# Patient Record
Sex: FEMALE | Race: WHITE | Employment: FULL TIME | URBAN - METROPOLITAN AREA
[De-identification: names, ages, dates, MRNs, and addresses within clinical notes are randomized per-mention and may not be internally consistent; named-entity substitution may affect disease eponyms.]

---

## 2019-05-07 ENCOUNTER — HOSPITAL ENCOUNTER (OUTPATIENT)
Dept: GENERAL RADIOLOGY | Age: 64
Discharge: HOME | End: 2019-05-07
Payer: COMMERCIAL

## 2019-05-07 ENCOUNTER — HOSPITAL ENCOUNTER (OUTPATIENT)
Age: 64
Discharge: HOME | End: 2019-05-07
Payer: COMMERCIAL

## 2019-05-07 DIAGNOSIS — Z01.818 PREOP TESTING: ICD-10-CM

## 2019-05-07 LAB
A/G RATIO: 1.4 (ref 1.1–2.2)
ABO/RH: NORMAL
ALBUMIN SERPL-MCNC: 4.4 G/DL (ref 3.4–5)
ALP BLD-CCNC: 86 U/L (ref 40–129)
ALT SERPL-CCNC: 23 U/L (ref 10–40)
ANION GAP SERPL CALCULATED.3IONS-SCNC: 13 MMOL/L (ref 3–16)
ANTIBODY SCREEN: NORMAL
AST SERPL-CCNC: 16 U/L (ref 15–37)
BACTERIA: ABNORMAL /HPF
BASOPHILS ABSOLUTE: 0.1 K/UL (ref 0–0.2)
BASOPHILS RELATIVE PERCENT: 1.1 %
BILIRUB SERPL-MCNC: 0.6 MG/DL (ref 0–1)
BILIRUBIN URINE: NEGATIVE
BLOOD, URINE: ABNORMAL
BUN BLDV-MCNC: 18 MG/DL (ref 7–20)
CALCIUM SERPL-MCNC: 10.5 MG/DL (ref 8.3–10.6)
CHLORIDE BLD-SCNC: 106 MMOL/L (ref 99–110)
CLARITY: ABNORMAL
CO2: 22 MMOL/L (ref 21–32)
COLOR: YELLOW
CREAT SERPL-MCNC: 0.9 MG/DL (ref 0.6–1.2)
EOSINOPHILS ABSOLUTE: 0.2 K/UL (ref 0–0.6)
EOSINOPHILS RELATIVE PERCENT: 2.1 %
EPITHELIAL CELLS, UA: 5 /HPF (ref 0–5)
GFR AFRICAN AMERICAN: >60
GFR NON-AFRICAN AMERICAN: >60
GLOBULIN: 3.1 G/DL
GLUCOSE BLD-MCNC: 99 MG/DL (ref 70–99)
GLUCOSE URINE: NEGATIVE MG/DL
HCT VFR BLD CALC: 44.9 % (ref 36–48)
HEMOGLOBIN: 15 G/DL (ref 12–16)
HYALINE CASTS: 3 /LPF (ref 0–8)
KETONES, URINE: NEGATIVE MG/DL
LEUKOCYTE ESTERASE, URINE: ABNORMAL
LYMPHOCYTES ABSOLUTE: 2.5 K/UL (ref 1–5.1)
LYMPHOCYTES RELATIVE PERCENT: 27.5 %
MCH RBC QN AUTO: 29.6 PG (ref 26–34)
MCHC RBC AUTO-ENTMCNC: 33.3 G/DL (ref 31–36)
MCV RBC AUTO: 88.9 FL (ref 80–100)
MICROSCOPIC EXAMINATION: YES
MONOCYTES ABSOLUTE: 0.8 K/UL (ref 0–1.3)
MONOCYTES RELATIVE PERCENT: 8.9 %
NEUTROPHILS ABSOLUTE: 5.6 K/UL (ref 1.7–7.7)
NEUTROPHILS RELATIVE PERCENT: 60.4 %
NITRITE, URINE: NEGATIVE
PDW BLD-RTO: 13.5 % (ref 12.4–15.4)
PH UA: 6 (ref 5–8)
PLATELET # BLD: 439 K/UL (ref 135–450)
PMV BLD AUTO: 8.6 FL (ref 5–10.5)
POTASSIUM SERPL-SCNC: 4 MMOL/L (ref 3.5–5.1)
PROTEIN UA: NEGATIVE MG/DL
RBC # BLD: 5.05 M/UL (ref 4–5.2)
RBC UA: 3 /HPF (ref 0–4)
SODIUM BLD-SCNC: 141 MMOL/L (ref 136–145)
SPECIFIC GRAVITY UA: 1.02 (ref 1–1.03)
TOTAL PROTEIN: 7.5 G/DL (ref 6.4–8.2)
URINE TYPE: ABNORMAL
UROBILINOGEN, URINE: 0.2 E.U./DL
WBC # BLD: 9.2 K/UL (ref 4–11)
WBC UA: 51 /HPF (ref 0–5)

## 2019-05-07 PROCEDURE — 86900 BLOOD TYPING SEROLOGIC ABO: CPT

## 2019-05-07 PROCEDURE — 71046 X-RAY EXAM CHEST 2 VIEWS: CPT

## 2019-05-07 PROCEDURE — 85025 COMPLETE CBC W/AUTO DIFF WBC: CPT

## 2019-05-07 PROCEDURE — 81001 URINALYSIS AUTO W/SCOPE: CPT

## 2019-05-07 PROCEDURE — 80053 COMPREHEN METABOLIC PANEL: CPT

## 2019-05-07 PROCEDURE — 86850 RBC ANTIBODY SCREEN: CPT

## 2019-05-07 PROCEDURE — 87086 URINE CULTURE/COLONY COUNT: CPT

## 2019-05-07 PROCEDURE — 86901 BLOOD TYPING SEROLOGIC RH(D): CPT

## 2019-05-07 PROCEDURE — 93005 ELECTROCARDIOGRAM TRACING: CPT | Performed by: OBSTETRICS & GYNECOLOGY

## 2019-05-07 PROCEDURE — 36415 COLL VENOUS BLD VENIPUNCTURE: CPT

## 2019-05-07 RX ORDER — LATANOPROST 50 UG/ML
1 SOLUTION/ DROPS OPHTHALMIC NIGHTLY
COMMUNITY

## 2019-05-07 NOTE — PROGRESS NOTES
Name_______________________________________Printed:____________________  Date and time of surgery___5/13/19 @ 1145_____________________Arrival Time:__1015 main hosp______________   1. Do not eat or drink anything after 12 midnight (or____hours) prior to surgery. This includes no water, chewing gum or mints. Endoscopy patients follow your doctors bowel prep instructions,which may include taking part of prep after midnight. 2. Take the following pills with a small sip of water on the morning of surgery___none_______________________________________________                  Do not take blood pressure medications ending in pril or sartan the marcel prior to surgery or the morning of surgery_   3. Aspirin, Ibuprofen, Advil, Naproxen, Vitamin E and other Anti-inflammatory products should be stopped for 5 days before surgery or as directed by your physician. 4. Check with your Doctor regarding stopping Plavix, Coumadin,Eliquis, Lovenox,Effient,Pradaxa,Xarelto, Fragmin or other blood thinners and follow their instructions. 5. Do not smoke, and do not drink any alcoholic beverages 24 hours prior to surgery. This includes NA Beer. Refrain from the usage of any recreational drugs. 6. You may brush your teeth and gargle the morning of surgery. DO NOT SWALLOW WATER   7. You MUST make arrangements for a responsible adult to stay on site while you are here and take you home after your surgery. You will not be allowed to leave alone or drive yourself home. It is strongly suggested someone stay with you the first 24 hrs. Your surgery will be cancelled if you do not have a ride home. 8. A parent/legal guardian must accompany a child scheduled for surgery and plan to stay at the hospital until the child is discharged. Please do not bring other children with you.    9. Please wear simple, loose fitting clothing to the hospital.  Do not bring valuables (money, credit cards, checkbooks, etc.) Do not wear any makeup (including no eye makeup) or nail polish on your fingers or toes. 10. DO NOT wear any jewelry or piercings on day of surgery. All body piercing jewelry must be removed. 11. If you have ___dentures, they will be removed before going to the OR; we will provide you a container. If you wear ___contact lenses or __x_glasses, they will be removed; please bring a case for them. 12. Please see your family doctor/pediatrician for a history & physical and/or concerning medications. Bring any test results/reports from your physician's office. PCP_____wrenn_____________Phone___________H&P Appt. Date________             13 If you  have a Living Will and Durable Power of  for Healthcare, please bring in a copy. 15. Notify your Surgeon if you develop any illness between now and surgery  time, cough, cold, fever, sore throat, nausea, vomiting, etc.  Please notify your surgeon if you experience dizziness, shortness of breath or blurred vision between now & the time of your surgery             15. DO NOT shave your operative site 96 hours prior to surgery. For face & neck surgery, men may use an electric razor 48 hours prior to surgery. 16. Shower the night before surgery with _x__Antibacterial soap ___Hibiclens             17. To provide excellent care visitors will be limited to one in the room at any given time. 18.  Please bring picture ID and insurance card. 19.  Visit our web site for additional information:  [email or web address]              20.During flu season no children under the age of 15 are permitted in the hospital for the safety of all patients.                               21. If you take a long acting insulin in the evening only  take half of your usual  dose the night  before your procedure              22. If you use a c-pap please bring DOS if staying overnight,             23.For your convenience [facility] has a pharmacy on site to fill your prescriptions. 24. If you use oxygen and have a portable tank please bring it  with you the DOS             25. Bring a complete list of all your medications with name and dose include any supplements. 26. Other__________________________________________   *Please call pre admission testing if you any further questions   Moraima Mendez     [id number] [facility]  [phone]   [address] [place] [address]       All above information reviewed with patient in person or by phone. Patient verbalizes understanding. All questions and concerns addressed.                                                                                                  Patient/Rep_______pt_____________                                                                                                                                    PRE OP INSTRUCTIONS

## 2019-05-09 LAB
EKG ATRIAL RATE: 63 BPM
EKG DIAGNOSIS: NORMAL
EKG P AXIS: 23 DEGREES
EKG P-R INTERVAL: 160 MS
EKG Q-T INTERVAL: 398 MS
EKG QRS DURATION: 78 MS
EKG QTC CALCULATION (BAZETT): 407 MS
EKG R AXIS: 27 DEGREES
EKG T AXIS: 39 DEGREES
EKG VENTRICULAR RATE: 63 BPM
URINE CULTURE, ROUTINE: NORMAL

## 2019-05-09 PROCEDURE — 93010 ELECTROCARDIOGRAM REPORT: CPT | Performed by: INTERNAL MEDICINE

## 2019-05-13 ENCOUNTER — HOSPITAL ENCOUNTER (OUTPATIENT)
Age: 64
Discharge: HOME | End: 2019-05-14
Attending: OBSTETRICS & GYNECOLOGY | Admitting: OBSTETRICS & GYNECOLOGY
Payer: COMMERCIAL

## 2019-05-13 ENCOUNTER — ANESTHESIA (OUTPATIENT)
Dept: OPERATING ROOM | Age: 64
End: 2019-05-13
Payer: COMMERCIAL

## 2019-05-13 ENCOUNTER — ANESTHESIA EVENT (OUTPATIENT)
Dept: OPERATING ROOM | Age: 64
End: 2019-05-13
Payer: COMMERCIAL

## 2019-05-13 VITALS
TEMPERATURE: 97.3 F | OXYGEN SATURATION: 87 % | SYSTOLIC BLOOD PRESSURE: 127 MMHG | RESPIRATION RATE: 8 BRPM | DIASTOLIC BLOOD PRESSURE: 62 MMHG

## 2019-05-13 DIAGNOSIS — Z01.818 PREOP TESTING: Primary | ICD-10-CM

## 2019-05-13 PROBLEM — C54.1 ENDOMETRIAL CANCER (HCC): Status: ACTIVE | Noted: 2019-05-13

## 2019-05-13 LAB
ABO/RH: NORMAL
ANTIBODY SCREEN: NORMAL

## 2019-05-13 PROCEDURE — 3700000001 HC ADD 15 MINUTES (ANESTHESIA): Performed by: OBSTETRICS & GYNECOLOGY

## 2019-05-13 PROCEDURE — 2580000003 HC RX 258: Performed by: NURSE ANESTHETIST, CERTIFIED REGISTERED

## 2019-05-13 PROCEDURE — 3600000019 HC SURGERY ROBOT ADDTL 15MIN: Performed by: OBSTETRICS & GYNECOLOGY

## 2019-05-13 PROCEDURE — 2580000003 HC RX 258

## 2019-05-13 PROCEDURE — 2500000003 HC RX 250 WO HCPCS: Performed by: OBSTETRICS & GYNECOLOGY

## 2019-05-13 PROCEDURE — 86850 RBC ANTIBODY SCREEN: CPT

## 2019-05-13 PROCEDURE — 6360000002 HC RX W HCPCS: Performed by: OBSTETRICS & GYNECOLOGY

## 2019-05-13 PROCEDURE — 6370000000 HC RX 637 (ALT 250 FOR IP)

## 2019-05-13 PROCEDURE — 7100000001 HC PACU RECOVERY - ADDTL 15 MIN: Performed by: OBSTETRICS & GYNECOLOGY

## 2019-05-13 PROCEDURE — 2709999900 HC NON-CHARGEABLE SUPPLY: Performed by: OBSTETRICS & GYNECOLOGY

## 2019-05-13 PROCEDURE — 2580000003 HC RX 258: Performed by: OBSTETRICS & GYNECOLOGY

## 2019-05-13 PROCEDURE — 6370000000 HC RX 637 (ALT 250 FOR IP): Performed by: OBSTETRICS & GYNECOLOGY

## 2019-05-13 PROCEDURE — 6360000002 HC RX W HCPCS: Performed by: NURSE ANESTHETIST, CERTIFIED REGISTERED

## 2019-05-13 PROCEDURE — 6370000000 HC RX 637 (ALT 250 FOR IP): Performed by: NURSE ANESTHETIST, CERTIFIED REGISTERED

## 2019-05-13 PROCEDURE — 88331 PATH CONSLTJ SURG 1 BLK 1SPC: CPT

## 2019-05-13 PROCEDURE — 7100000000 HC PACU RECOVERY - FIRST 15 MIN: Performed by: OBSTETRICS & GYNECOLOGY

## 2019-05-13 PROCEDURE — 94150 VITAL CAPACITY TEST: CPT

## 2019-05-13 PROCEDURE — 88309 TISSUE EXAM BY PATHOLOGIST: CPT

## 2019-05-13 PROCEDURE — S2900 ROBOTIC SURGICAL SYSTEM: HCPCS | Performed by: OBSTETRICS & GYNECOLOGY

## 2019-05-13 PROCEDURE — 3700000000 HC ANESTHESIA ATTENDED CARE: Performed by: OBSTETRICS & GYNECOLOGY

## 2019-05-13 PROCEDURE — 3600000009 HC SURGERY ROBOT BASE: Performed by: OBSTETRICS & GYNECOLOGY

## 2019-05-13 PROCEDURE — 88307 TISSUE EXAM BY PATHOLOGIST: CPT

## 2019-05-13 PROCEDURE — 6360000002 HC RX W HCPCS

## 2019-05-13 PROCEDURE — 86901 BLOOD TYPING SEROLOGIC RH(D): CPT

## 2019-05-13 PROCEDURE — 2500000003 HC RX 250 WO HCPCS: Performed by: NURSE ANESTHETIST, CERTIFIED REGISTERED

## 2019-05-13 PROCEDURE — 36415 COLL VENOUS BLD VENIPUNCTURE: CPT

## 2019-05-13 PROCEDURE — 86900 BLOOD TYPING SEROLOGIC ABO: CPT

## 2019-05-13 PROCEDURE — 6360000002 HC RX W HCPCS: Performed by: ANESTHESIOLOGY

## 2019-05-13 PROCEDURE — 94760 N-INVAS EAR/PLS OXIMETRY 1: CPT

## 2019-05-13 RX ORDER — NEOSTIGMINE METHYLSULFATE 5 MG/5 ML
SYRINGE (ML) INTRAVENOUS PRN
Status: DISCONTINUED | OUTPATIENT
Start: 2019-05-13 | End: 2019-05-13 | Stop reason: SDUPTHER

## 2019-05-13 RX ORDER — LATANOPROST 50 UG/ML
1 SOLUTION/ DROPS OPHTHALMIC NIGHTLY
Status: DISCONTINUED | OUTPATIENT
Start: 2019-05-13 | End: 2019-05-14 | Stop reason: HOSPADM

## 2019-05-13 RX ORDER — KETOROLAC TROMETHAMINE 30 MG/ML
INJECTION, SOLUTION INTRAMUSCULAR; INTRAVENOUS
Status: COMPLETED
Start: 2019-05-13 | End: 2019-05-13

## 2019-05-13 RX ORDER — ACETAMINOPHEN 500 MG
500 TABLET ORAL EVERY 6 HOURS
Status: DISCONTINUED | OUTPATIENT
Start: 2019-05-13 | End: 2019-05-14 | Stop reason: HOSPADM

## 2019-05-13 RX ORDER — LIDOCAINE HYDROCHLORIDE 20 MG/ML
INJECTION, SOLUTION INFILTRATION; PERINEURAL PRN
Status: DISCONTINUED | OUTPATIENT
Start: 2019-05-13 | End: 2019-05-13 | Stop reason: SDUPTHER

## 2019-05-13 RX ORDER — HYDROMORPHONE HCL 110MG/55ML
0.25 PATIENT CONTROLLED ANALGESIA SYRINGE INTRAVENOUS EVERY 5 MIN PRN
Status: DISCONTINUED | OUTPATIENT
Start: 2019-05-13 | End: 2019-05-13 | Stop reason: HOSPADM

## 2019-05-13 RX ORDER — MAGNESIUM HYDROXIDE 1200 MG/15ML
LIQUID ORAL
Status: COMPLETED | OUTPATIENT
Start: 2019-05-13 | End: 2019-05-13

## 2019-05-13 RX ORDER — PROMETHAZINE HYDROCHLORIDE 25 MG/ML
6.25 INJECTION, SOLUTION INTRAMUSCULAR; INTRAVENOUS EVERY 30 MIN PRN
Status: DISCONTINUED | OUTPATIENT
Start: 2019-05-13 | End: 2019-05-13 | Stop reason: HOSPADM

## 2019-05-13 RX ORDER — HYDROCODONE BITARTRATE AND ACETAMINOPHEN 5; 325 MG/1; MG/1
1 TABLET ORAL PRN
Status: DISCONTINUED | OUTPATIENT
Start: 2019-05-13 | End: 2019-05-13 | Stop reason: HOSPADM

## 2019-05-13 RX ORDER — SODIUM CHLORIDE 0.9 % (FLUSH) 0.9 %
10 SYRINGE (ML) INJECTION PRN
Status: DISCONTINUED | OUTPATIENT
Start: 2019-05-13 | End: 2019-05-14 | Stop reason: HOSPADM

## 2019-05-13 RX ORDER — HYDROMORPHONE HCL 110MG/55ML
0.5 PATIENT CONTROLLED ANALGESIA SYRINGE INTRAVENOUS EVERY 5 MIN PRN
Status: DISCONTINUED | OUTPATIENT
Start: 2019-05-13 | End: 2019-05-13 | Stop reason: HOSPADM

## 2019-05-13 RX ORDER — SODIUM CHLORIDE, SODIUM LACTATE, POTASSIUM CHLORIDE, CALCIUM CHLORIDE 600; 310; 30; 20 MG/100ML; MG/100ML; MG/100ML; MG/100ML
INJECTION, SOLUTION INTRAVENOUS CONTINUOUS
Status: DISCONTINUED | OUTPATIENT
Start: 2019-05-13 | End: 2019-05-13

## 2019-05-13 RX ORDER — SODIUM CHLORIDE, SODIUM LACTATE, POTASSIUM CHLORIDE, CALCIUM CHLORIDE 600; 310; 30; 20 MG/100ML; MG/100ML; MG/100ML; MG/100ML
INJECTION, SOLUTION INTRAVENOUS CONTINUOUS PRN
Status: DISCONTINUED | OUTPATIENT
Start: 2019-05-13 | End: 2019-05-13 | Stop reason: SDUPTHER

## 2019-05-13 RX ORDER — DEXTROSE AND SODIUM CHLORIDE 5; .45 G/100ML; G/100ML
INJECTION, SOLUTION INTRAVENOUS CONTINUOUS
Status: DISCONTINUED | OUTPATIENT
Start: 2019-05-13 | End: 2019-05-14

## 2019-05-13 RX ORDER — ALBUTEROL SULFATE 90 UG/1
AEROSOL, METERED RESPIRATORY (INHALATION) PRN
Status: DISCONTINUED | OUTPATIENT
Start: 2019-05-13 | End: 2019-05-13 | Stop reason: SDUPTHER

## 2019-05-13 RX ORDER — BUPIVACAINE HYDROCHLORIDE AND EPINEPHRINE 5; 5 MG/ML; UG/ML
INJECTION, SOLUTION EPIDURAL; INTRACAUDAL; PERINEURAL
Status: COMPLETED | OUTPATIENT
Start: 2019-05-13 | End: 2019-05-13

## 2019-05-13 RX ORDER — SODIUM CHLORIDE 0.9 % (FLUSH) 0.9 %
10 SYRINGE (ML) INJECTION EVERY 12 HOURS SCHEDULED
Status: DISCONTINUED | OUTPATIENT
Start: 2019-05-13 | End: 2019-05-14 | Stop reason: HOSPADM

## 2019-05-13 RX ORDER — FENTANYL CITRATE 50 UG/ML
25 INJECTION, SOLUTION INTRAMUSCULAR; INTRAVENOUS EVERY 5 MIN PRN
Status: DISCONTINUED | OUTPATIENT
Start: 2019-05-13 | End: 2019-05-13 | Stop reason: HOSPADM

## 2019-05-13 RX ORDER — MEPERIDINE HYDROCHLORIDE 25 MG/ML
12.5 INJECTION INTRAMUSCULAR; INTRAVENOUS; SUBCUTANEOUS EVERY 5 MIN PRN
Status: DISCONTINUED | OUTPATIENT
Start: 2019-05-13 | End: 2019-05-13 | Stop reason: HOSPADM

## 2019-05-13 RX ORDER — LIDOCAINE HYDROCHLORIDE 10 MG/ML
0.5 INJECTION, SOLUTION EPIDURAL; INFILTRATION; INTRACAUDAL; PERINEURAL ONCE
Status: DISCONTINUED | OUTPATIENT
Start: 2019-05-13 | End: 2019-05-13 | Stop reason: HOSPADM

## 2019-05-13 RX ORDER — SUCCINYLCHOLINE CHLORIDE 20 MG/ML
INJECTION INTRAMUSCULAR; INTRAVENOUS PRN
Status: DISCONTINUED | OUTPATIENT
Start: 2019-05-13 | End: 2019-05-13 | Stop reason: SDUPTHER

## 2019-05-13 RX ORDER — FENTANYL CITRATE 50 UG/ML
50 INJECTION, SOLUTION INTRAMUSCULAR; INTRAVENOUS EVERY 5 MIN PRN
Status: DISCONTINUED | OUTPATIENT
Start: 2019-05-13 | End: 2019-05-13 | Stop reason: HOSPADM

## 2019-05-13 RX ORDER — DIPHENHYDRAMINE HYDROCHLORIDE 50 MG/ML
12.5 INJECTION INTRAMUSCULAR; INTRAVENOUS
Status: DISCONTINUED | OUTPATIENT
Start: 2019-05-13 | End: 2019-05-13 | Stop reason: HOSPADM

## 2019-05-13 RX ORDER — ONDANSETRON 2 MG/ML
4 INJECTION INTRAMUSCULAR; INTRAVENOUS EVERY 8 HOURS PRN
Status: DISCONTINUED | OUTPATIENT
Start: 2019-05-13 | End: 2019-05-14 | Stop reason: HOSPADM

## 2019-05-13 RX ORDER — KETOROLAC TROMETHAMINE 30 MG/ML
15 INJECTION, SOLUTION INTRAMUSCULAR; INTRAVENOUS EVERY 6 HOURS
Status: DISCONTINUED | OUTPATIENT
Start: 2019-05-13 | End: 2019-05-14 | Stop reason: HOSPADM

## 2019-05-13 RX ORDER — HYDROMORPHONE HYDROCHLORIDE 1 MG/ML
0.5 INJECTION, SOLUTION INTRAMUSCULAR; INTRAVENOUS; SUBCUTANEOUS
Status: DISCONTINUED | OUTPATIENT
Start: 2019-05-13 | End: 2019-05-14 | Stop reason: HOSPADM

## 2019-05-13 RX ORDER — HYDROMORPHONE HCL 110MG/55ML
PATIENT CONTROLLED ANALGESIA SYRINGE INTRAVENOUS
Status: COMPLETED
Start: 2019-05-13 | End: 2019-05-13

## 2019-05-13 RX ORDER — ONDANSETRON 2 MG/ML
INJECTION INTRAMUSCULAR; INTRAVENOUS PRN
Status: DISCONTINUED | OUTPATIENT
Start: 2019-05-13 | End: 2019-05-13 | Stop reason: SDUPTHER

## 2019-05-13 RX ORDER — PROPOFOL 10 MG/ML
INJECTION, EMULSION INTRAVENOUS PRN
Status: DISCONTINUED | OUTPATIENT
Start: 2019-05-13 | End: 2019-05-13 | Stop reason: SDUPTHER

## 2019-05-13 RX ORDER — FENTANYL CITRATE 50 UG/ML
INJECTION, SOLUTION INTRAMUSCULAR; INTRAVENOUS PRN
Status: DISCONTINUED | OUTPATIENT
Start: 2019-05-13 | End: 2019-05-13 | Stop reason: SDUPTHER

## 2019-05-13 RX ORDER — HYDROCODONE BITARTRATE AND ACETAMINOPHEN 5; 325 MG/1; MG/1
2 TABLET ORAL PRN
Status: DISCONTINUED | OUTPATIENT
Start: 2019-05-13 | End: 2019-05-13 | Stop reason: HOSPADM

## 2019-05-13 RX ORDER — CEFAZOLIN SODIUM 2 G/100ML
2 INJECTION, SOLUTION INTRAVENOUS
Status: COMPLETED | OUTPATIENT
Start: 2019-05-13 | End: 2019-05-13

## 2019-05-13 RX ORDER — VECURONIUM BROMIDE 1 MG/ML
INJECTION, POWDER, LYOPHILIZED, FOR SOLUTION INTRAVENOUS PRN
Status: DISCONTINUED | OUTPATIENT
Start: 2019-05-13 | End: 2019-05-13 | Stop reason: SDUPTHER

## 2019-05-13 RX ORDER — MAGNESIUM HYDROXIDE 1200 MG/15ML
LIQUID ORAL CONTINUOUS PRN
Status: COMPLETED | OUTPATIENT
Start: 2019-05-13 | End: 2019-05-13

## 2019-05-13 RX ORDER — GLYCOPYRROLATE 0.2 MG/ML
INJECTION INTRAMUSCULAR; INTRAVENOUS PRN
Status: DISCONTINUED | OUTPATIENT
Start: 2019-05-13 | End: 2019-05-13 | Stop reason: SDUPTHER

## 2019-05-13 RX ORDER — OXYCODONE HYDROCHLORIDE 5 MG/1
10 TABLET ORAL EVERY 4 HOURS PRN
Status: DISCONTINUED | OUTPATIENT
Start: 2019-05-13 | End: 2019-05-14 | Stop reason: HOSPADM

## 2019-05-13 RX ORDER — INDOCYANINE GREEN AND WATER 25 MG
KIT INJECTION
Status: COMPLETED | OUTPATIENT
Start: 2019-05-13 | End: 2019-05-13

## 2019-05-13 RX ORDER — OXYCODONE HYDROCHLORIDE 5 MG/1
5 TABLET ORAL EVERY 4 HOURS PRN
Status: DISCONTINUED | OUTPATIENT
Start: 2019-05-13 | End: 2019-05-14 | Stop reason: HOSPADM

## 2019-05-13 RX ORDER — DEXTROSE AND SODIUM CHLORIDE 5; .45 G/100ML; G/100ML
INJECTION, SOLUTION INTRAVENOUS
Status: COMPLETED
Start: 2019-05-13 | End: 2019-05-13

## 2019-05-13 RX ORDER — DEXAMETHASONE SODIUM PHOSPHATE 4 MG/ML
INJECTION, SOLUTION INTRA-ARTICULAR; INTRALESIONAL; INTRAMUSCULAR; INTRAVENOUS; SOFT TISSUE PRN
Status: DISCONTINUED | OUTPATIENT
Start: 2019-05-13 | End: 2019-05-13 | Stop reason: SDUPTHER

## 2019-05-13 RX ORDER — HYDROMORPHONE HCL 110MG/55ML
PATIENT CONTROLLED ANALGESIA SYRINGE INTRAVENOUS PRN
Status: DISCONTINUED | OUTPATIENT
Start: 2019-05-13 | End: 2019-05-13 | Stop reason: SDUPTHER

## 2019-05-13 RX ORDER — HYDROMORPHONE HYDROCHLORIDE 1 MG/ML
0.25 INJECTION, SOLUTION INTRAMUSCULAR; INTRAVENOUS; SUBCUTANEOUS
Status: DISCONTINUED | OUTPATIENT
Start: 2019-05-13 | End: 2019-05-14 | Stop reason: HOSPADM

## 2019-05-13 RX ADMIN — FENTANYL CITRATE 50 MCG: 50 INJECTION, SOLUTION INTRAMUSCULAR; INTRAVENOUS at 11:51

## 2019-05-13 RX ADMIN — DEXAMETHASONE SODIUM PHOSPHATE 10 MG: 4 INJECTION, SOLUTION INTRAMUSCULAR; INTRAVENOUS at 11:19

## 2019-05-13 RX ADMIN — SODIUM CHLORIDE, POTASSIUM CHLORIDE, SODIUM LACTATE AND CALCIUM CHLORIDE: 600; 310; 30; 20 INJECTION, SOLUTION INTRAVENOUS at 11:07

## 2019-05-13 RX ADMIN — LIDOCAINE HYDROCHLORIDE 100 MG: 20 INJECTION, SOLUTION INFILTRATION; PERINEURAL at 11:10

## 2019-05-13 RX ADMIN — ONDANSETRON 4 MG: 2 INJECTION INTRAMUSCULAR; INTRAVENOUS at 13:04

## 2019-05-13 RX ADMIN — ACETAMINOPHEN 500 MG: 500 TABLET ORAL at 21:26

## 2019-05-13 RX ADMIN — KETOROLAC TROMETHAMINE 15 MG: 30 INJECTION, SOLUTION INTRAMUSCULAR at 13:48

## 2019-05-13 RX ADMIN — HYDROMORPHONE HYDROCHLORIDE 0.5 MG: 2 INJECTION, SOLUTION INTRAMUSCULAR; INTRAVENOUS; SUBCUTANEOUS at 14:00

## 2019-05-13 RX ADMIN — SUCCINYLCHOLINE CHLORIDE 160 MG: 20 INJECTION, SOLUTION INTRAMUSCULAR; INTRAVENOUS at 11:10

## 2019-05-13 RX ADMIN — GLYCOPYRROLATE 0.6 MG: 0.2 INJECTION, SOLUTION INTRAMUSCULAR; INTRAVENOUS at 12:58

## 2019-05-13 RX ADMIN — HYDROMORPHONE HYDROCHLORIDE 0.2 MG: 2 INJECTION, SOLUTION INTRAMUSCULAR; INTRAVENOUS; SUBCUTANEOUS at 12:52

## 2019-05-13 RX ADMIN — VECURONIUM BROMIDE 6 MG: 1 INJECTION, POWDER, LYOPHILIZED, FOR SOLUTION INTRAVENOUS at 11:20

## 2019-05-13 RX ADMIN — KETOROLAC TROMETHAMINE 15 MG: 30 INJECTION, SOLUTION INTRAMUSCULAR at 21:29

## 2019-05-13 RX ADMIN — Medication 4 MG: at 12:58

## 2019-05-13 RX ADMIN — HYDROMORPHONE HYDROCHLORIDE 0.5 MG: 2 INJECTION, SOLUTION INTRAMUSCULAR; INTRAVENOUS; SUBCUTANEOUS at 13:50

## 2019-05-13 RX ADMIN — ALBUTEROL SULFATE 8 PUFF: 90 AEROSOL, METERED RESPIRATORY (INHALATION) at 11:19

## 2019-05-13 RX ADMIN — FENTANYL CITRATE 50 MCG: 50 INJECTION, SOLUTION INTRAMUSCULAR; INTRAVENOUS at 12:02

## 2019-05-13 RX ADMIN — GLYCOPYRROLATE 0.2 MG: 0.2 INJECTION, SOLUTION INTRAMUSCULAR; INTRAVENOUS at 11:48

## 2019-05-13 RX ADMIN — VECURONIUM BROMIDE 1 MG: 1 INJECTION, POWDER, LYOPHILIZED, FOR SOLUTION INTRAVENOUS at 11:30

## 2019-05-13 RX ADMIN — ONDANSETRON 4 MG: 2 INJECTION INTRAMUSCULAR; INTRAVENOUS at 16:56

## 2019-05-13 RX ADMIN — PROPOFOL 50 MG: 10 INJECTION, EMULSION INTRAVENOUS at 11:12

## 2019-05-13 RX ADMIN — DEXTROSE AND SODIUM CHLORIDE: 5; 450 INJECTION, SOLUTION INTRAVENOUS at 22:49

## 2019-05-13 RX ADMIN — PROPOFOL 150 MG: 10 INJECTION, EMULSION INTRAVENOUS at 11:10

## 2019-05-13 RX ADMIN — CEFAZOLIN SODIUM 2 G: 2 INJECTION, SOLUTION INTRAVENOUS at 11:03

## 2019-05-13 RX ADMIN — FENTANYL CITRATE 100 MCG: 50 INJECTION, SOLUTION INTRAMUSCULAR; INTRAVENOUS at 11:10

## 2019-05-13 RX ADMIN — HYDROMORPHONE HYDROCHLORIDE 0.5 MG: 2 INJECTION, SOLUTION INTRAMUSCULAR; INTRAVENOUS; SUBCUTANEOUS at 14:12

## 2019-05-13 RX ADMIN — LATANOPROST 1 DROP: 50 SOLUTION OPHTHALMIC at 21:26

## 2019-05-13 RX ADMIN — FENTANYL CITRATE 50 MCG: 50 INJECTION, SOLUTION INTRAMUSCULAR; INTRAVENOUS at 12:27

## 2019-05-13 RX ADMIN — ACETAMINOPHEN 500 MG: 500 TABLET ORAL at 16:56

## 2019-05-13 RX ADMIN — DEXTROSE AND SODIUM CHLORIDE: 5; 450 INJECTION, SOLUTION INTRAVENOUS at 13:48

## 2019-05-13 RX ADMIN — Medication 10 ML: at 21:25

## 2019-05-13 ASSESSMENT — PULMONARY FUNCTION TESTS
PIF_VALUE: 34
PIF_VALUE: 35
PIF_VALUE: 16
PIF_VALUE: 24
PIF_VALUE: 16
PIF_VALUE: 33
PIF_VALUE: 16
PIF_VALUE: 5
PIF_VALUE: 35
PIF_VALUE: 32
PIF_VALUE: 34
PIF_VALUE: 35
PIF_VALUE: 16
PIF_VALUE: 35
PIF_VALUE: 34
PIF_VALUE: 15
PIF_VALUE: 1
PIF_VALUE: 16
PIF_VALUE: 35
PIF_VALUE: 30
PIF_VALUE: 35
PIF_VALUE: 34
PIF_VALUE: 35
PIF_VALUE: 0
PIF_VALUE: 34
PIF_VALUE: 32
PIF_VALUE: 33
PIF_VALUE: 2
PIF_VALUE: 16
PIF_VALUE: 16
PIF_VALUE: 2
PIF_VALUE: 35
PIF_VALUE: 34
PIF_VALUE: 16
PIF_VALUE: 35
PIF_VALUE: 35
PIF_VALUE: 16
PIF_VALUE: 25
PIF_VALUE: 34
PIF_VALUE: 34
PIF_VALUE: 4
PIF_VALUE: 16
PIF_VALUE: 19
PIF_VALUE: 26
PIF_VALUE: 20
PIF_VALUE: 15
PIF_VALUE: 34
PIF_VALUE: 16
PIF_VALUE: 34
PIF_VALUE: 15
PIF_VALUE: 0
PIF_VALUE: 35
PIF_VALUE: 16
PIF_VALUE: 34
PIF_VALUE: 34
PIF_VALUE: 33
PIF_VALUE: 33
PIF_VALUE: 25
PIF_VALUE: 4
PIF_VALUE: 16
PIF_VALUE: 34
PIF_VALUE: 23
PIF_VALUE: 4
PIF_VALUE: 34
PIF_VALUE: 3
PIF_VALUE: 7
PIF_VALUE: 34
PIF_VALUE: 32
PIF_VALUE: 33
PIF_VALUE: 16
PIF_VALUE: 34
PIF_VALUE: 16
PIF_VALUE: 34
PIF_VALUE: 35
PIF_VALUE: 34
PIF_VALUE: 25
PIF_VALUE: 33
PIF_VALUE: 19
PIF_VALUE: 34
PIF_VALUE: 15
PIF_VALUE: 32
PIF_VALUE: 3
PIF_VALUE: 35
PIF_VALUE: 3
PIF_VALUE: 34
PIF_VALUE: 33
PIF_VALUE: 35
PIF_VALUE: 35
PIF_VALUE: 0
PIF_VALUE: 2
PIF_VALUE: 34
PIF_VALUE: 16
PIF_VALUE: 34
PIF_VALUE: 34
PIF_VALUE: 18
PIF_VALUE: 3
PIF_VALUE: 34
PIF_VALUE: 25
PIF_VALUE: 32
PIF_VALUE: 16
PIF_VALUE: 3
PIF_VALUE: 16
PIF_VALUE: 15
PIF_VALUE: 34
PIF_VALUE: 1
PIF_VALUE: 33
PIF_VALUE: 16
PIF_VALUE: 34
PIF_VALUE: 35
PIF_VALUE: 33
PIF_VALUE: 34
PIF_VALUE: 24
PIF_VALUE: 33
PIF_VALUE: 0
PIF_VALUE: 20
PIF_VALUE: 34
PIF_VALUE: 17
PIF_VALUE: 34
PIF_VALUE: 32
PIF_VALUE: 34
PIF_VALUE: 20
PIF_VALUE: 3
PIF_VALUE: 35
PIF_VALUE: 33
PIF_VALUE: 5
PIF_VALUE: 15
PIF_VALUE: 19
PIF_VALUE: 35
PIF_VALUE: 32
PIF_VALUE: 34
PIF_VALUE: 35
PIF_VALUE: 20
PIF_VALUE: 33

## 2019-05-13 ASSESSMENT — PAIN SCALES - GENERAL
PAINLEVEL_OUTOF10: 8
PAINLEVEL_OUTOF10: 7
PAINLEVEL_OUTOF10: 4
PAINLEVEL_OUTOF10: 4
PAINLEVEL_OUTOF10: 0

## 2019-05-13 NOTE — ANESTHESIA POSTPROCEDURE EVALUATION
Department of Anesthesiology  Postprocedure Note    Patient: Missy Gaming  MRN: [id number]  YOB: 1955  Date of evaluation: 5/13/2019  Time:  1:25 PM     Procedure Summary     Date:  05/13/19 Room / Location:  [facility] OR 08 / [facility] OR    Anesthesia Start:  1107 Anesthesia Stop:  1321    Procedure:  ROBOTIC LAPAROSCOPIC HYSTERECTOMY, BILATERAL SALPINGO-OOPHORECTOMY, PELVIC  LYMPH NODE BIOPSY (N/A Abdomen) Diagnosis:  (ENDOMETRIAL CANCER C54.1)    Surgeon:  Manuel Barrientos MD Responsible Provider:      Anesthesia Type:  general ASA Status:  2          Anesthesia Type: general    Jovan Phase I: Jovan Score: 8    Jovan Phase II:      Last vitals: Reviewed and per EMR flowsheets.        Anesthesia Post Evaluation    Patient location during evaluation: PACU  Patient participation: complete - patient participated  Level of consciousness: awake and alert  Pain score: 2  Airway patency: patent  Nausea & Vomiting: no vomiting  Complications: no  Cardiovascular status: blood pressure returned to baseline  Respiratory status: acceptable  Hydration status: euvolemic

## 2019-05-13 NOTE — OP NOTE
patient. She was able to tolerate steep Trendelenburg. We prepped and draped the patient in the usual sterile fashion and placed a catheter. A sterile speculum was placed into the vagina, the cervix was visualized and sounded to 8 cm. A uterine manipulator was placed. The speculum was removed. Attention was turned to the abdomen. A supraumbilical skin incision and a Veress needle was inserted. The abdomen was insufflated to a patient pressure of 15 mmHg. A trocar was inserted followed by insertion of the robotic scope noting good placement and a normal upper abdomen. Four additional robotic trocars were placed. The robot was then docked. Washings were collected. Bilateral sidewall peritoneum was opened using the monopolar device and the ureters were identified bilaterally. The infundibular pelvic ligaments were skeletonized, fulgurated, cut and the round ligaments were transected followed by dissection of the bladder peritoneum away from the uterus and cervix. The uterine arteries were fulgurated bilaterally cutting the tissue away with good hemostasis noted. We continued our dissection down to the remaining broad ligament, cardinal ligament until reaching the uterosacral ligaments. The cervical/vaginal angle was incised with the monopolar device and the uterus, cervix, bilateral tubes and ovaries were then removed through the vaginal opening. I then removed the pelvic lymph nodes from the mid common iliac artery distal to the deep circumflex iliac vein as far lateral to the genitofemoral nerve and medial down to the internal iliac artery and cleared the ofe tissue from the obturator fossa. The obturator nerve on direct visualization was without injury noted. Prior to removing aortic nodes the frozen section revealed <50% myometrial invasion. Using 0 Vicryl suture the vaginal cuff angles were closed in a figure-of-eight fashion. Followed by a running V-lock suture.      Copious irrigation was performed. Good hemostasis was noted. Evicel was placed. The CO2 gas was desufflated from the patients abdomen. All trocars were removed. The midline camera port site was closed with 0 Vicryl suture followed by closure of all skin sites with 4-0 Monocryl suture and Dermabond glue. The patient tolerated the procedure well. Counts were correct x2. She was taken to the PACU in stable condition.         Electronically signed by Karrie Dillon on 5/13/2019

## 2019-05-13 NOTE — PROGRESS NOTES
Pt on unit in stable condition denies any pain. o2 1L pt sat 97% orientated to room call light and bed controls. Family at bedside. Pt eating ice chips.  Call light in reach

## 2019-05-13 NOTE — ANESTHESIA PRE PROCEDURE
Department of Anesthesiology  Preprocedure Note       Name:  Chaka Medina   Age:  66 y.o.  :  1955                                          MRN:  [id number]         Date:  2019      Surgeon: Ck Jasso):  Nano Mcdonald MD    Procedure: ROBOTIC LAPAROSCOPIC HYSTERECTOMY, BILATERAL SALPINGO-OOPHORECTOMY, SENTINEL LYMPH NODE BIOPSY, AND ANY OTHER INDICATED PROCEDURES (N/A Abdomen)    Medications prior to admission:   Prior to Admission medications    Medication Sig Start Date End Date Taking? Authorizing Provider   Eluxadoline (VIBERZI) 100 MG TABS Take by mouth 2 times daily as needed. Yes Historical Provider, MD   latanoprost (XALATAN) 0.005 % ophthalmic solution Place 1 drop into both eyes nightly   Yes Historical Provider, MD       Current medications:    No current facility-administered medications for this encounter. Current Outpatient Medications   Medication Sig Dispense Refill    Eluxadoline (VIBERZI) 100 MG TABS Take by mouth 2 times daily as needed.  latanoprost (XALATAN) 0.005 % ophthalmic solution Place 1 drop into both eyes nightly         Allergies: Allergies   Allergen Reactions    Codeine Nausea Only    Nsaids Rash     Can take ibuprofen       Problem List:  There is no problem list on file for this patient.       Past Medical History:        Diagnosis Date    Cancer ([address]     endometrial    Glaucoma     IBS (irritable bowel syndrome)     Plantar fasciitis        Past Surgical History:        Procedure Laterality Date    CYST REMOVAL      x2 scalp    DILATION AND CURETTAGE OF UTERUS      SKIN BIOPSY      face       Social History:    Social History     Tobacco Use    Smoking status: Never Smoker    Smokeless tobacco: Never Used   Substance Use Topics    Alcohol use: Yes     Comment: occassional                                Counseling given: Not Answered      Vital Signs (Current):   Vitals:    19 0951   Weight: 250 lb (113.4 kg)   Height: 5' 6\" (1.676 m)                                              BP Readings from Last 3 Encounters:   No data found for BP       NPO Status:                                                                                 BMI:   Wt Readings from Last 3 Encounters:   No data found for Wt     Body mass index is 40.35 kg/m². CBC:   Lab Results   Component Value Date    WBC 9.2 05/07/2019    RBC 5.05 05/07/2019    HGB 15.0 05/07/2019    HCT 44.9 05/07/2019    MCV 88.9 05/07/2019    RDW 13.5 05/07/2019     05/07/2019       CMP:   Lab Results   Component Value Date     05/07/2019    K 4.0 05/07/2019     05/07/2019    CO2 22 05/07/2019    BUN 18 05/07/2019    CREATININE 0.9 05/07/2019    GFRAA >60 05/07/2019    AGRATIO 1.4 05/07/2019    LABGLOM >60 05/07/2019    GLUCOSE 99 05/07/2019    PROT 7.5 05/07/2019    CALCIUM 10.5 05/07/2019    BILITOT 0.6 05/07/2019    ALKPHOS 86 05/07/2019    AST 16 05/07/2019    ALT 23 05/07/2019       POC Tests: No results for input(s): POCGLU, POCNA, POCK, POCCL, POCBUN, POCHEMO, POCHCT in the last 72 hours. Coags: No results found for: PROTIME, INR, APTT    HCG (If Applicable): No results found for: PREGTESTUR, PREGSERUM, HCG, HCGQUANT     ABGs: No results found for: PHART, PO2ART, HYV4GZV, JMR9LFT, BEART, M1ZEEFYH     Type & Screen (If Applicable):  No results found for: LABABO, [address]    Anesthesia Evaluation  Patient summary reviewed and Nursing notes reviewed  Airway: Mallampati: I  TM distance: >3 FB   Neck ROM: full  Mouth opening: > = 3 FB Dental:          Pulmonary:                              Cardiovascular:  Exercise tolerance: good (>4 METS),                     Neuro/Psych:               GI/Hepatic/Renal:             Endo/Other:                      ROS comment: Endometrial ca Abdominal:           Vascular:                                        Anesthesia Plan      general     ASA 2       Induction: intravenous.     MIPS: Postoperative opioids intended, Prophylactic antiemetics administered and Postoperative trial extubation. Anesthetic plan and risks discussed with patient. Plan discussed with CRNA.     Attending anesthesiologist reviewed and agrees with Tex Khan MD   5/13/2019

## 2019-05-13 NOTE — PROGRESS NOTES
Patient admitted to PACU via bed, arouses to stimuli, moves ext to command. Respirations adeq on 6L o2 per simple mask spo2 93%. Skin warm and dry with good color. abd soft. Skin glue to 5 sites dry and intact. Will continue to  monitor.

## 2019-05-13 NOTE — PROGRESS NOTES
Pt urinating post rebolledo. Urine with pink ting. Magdalena pad scant amount of blood. Pt denies any pain. Family at bedside. call light in reach

## 2019-05-14 VITALS
BODY MASS INDEX: 39.42 KG/M2 | HEART RATE: 68 BPM | RESPIRATION RATE: 16 BRPM | WEIGHT: 245.3 LBS | HEIGHT: 66 IN | DIASTOLIC BLOOD PRESSURE: 65 MMHG | SYSTOLIC BLOOD PRESSURE: 115 MMHG | TEMPERATURE: 97.9 F | OXYGEN SATURATION: 94 %

## 2019-05-14 LAB
ANION GAP SERPL CALCULATED.3IONS-SCNC: 10 MMOL/L (ref 3–16)
BASOPHILS ABSOLUTE: 0 K/UL (ref 0–0.2)
BASOPHILS RELATIVE PERCENT: 0.2 %
BUN BLDV-MCNC: 10 MG/DL (ref 7–20)
CALCIUM SERPL-MCNC: 10.6 MG/DL (ref 8.3–10.6)
CHLORIDE BLD-SCNC: 110 MMOL/L (ref 99–110)
CO2: 22 MMOL/L (ref 21–32)
CREAT SERPL-MCNC: 0.7 MG/DL (ref 0.6–1.2)
EOSINOPHILS ABSOLUTE: 0 K/UL (ref 0–0.6)
EOSINOPHILS RELATIVE PERCENT: 0 %
GFR AFRICAN AMERICAN: >60
GFR NON-AFRICAN AMERICAN: >60
GLUCOSE BLD-MCNC: 148 MG/DL (ref 70–99)
HCT VFR BLD CALC: 40.3 % (ref 36–48)
HEMOGLOBIN: 13.5 G/DL (ref 12–16)
LYMPHOCYTES ABSOLUTE: 1.1 K/UL (ref 1–5.1)
LYMPHOCYTES RELATIVE PERCENT: 7.6 %
MAGNESIUM: 2.2 MG/DL (ref 1.8–2.4)
MCH RBC QN AUTO: 29.9 PG (ref 26–34)
MCHC RBC AUTO-ENTMCNC: 33.4 G/DL (ref 31–36)
MCV RBC AUTO: 89.5 FL (ref 80–100)
MONOCYTES ABSOLUTE: 1.4 K/UL (ref 0–1.3)
MONOCYTES RELATIVE PERCENT: 9.7 %
NEUTROPHILS ABSOLUTE: 11.6 K/UL (ref 1.7–7.7)
NEUTROPHILS RELATIVE PERCENT: 82.5 %
PDW BLD-RTO: 13.8 % (ref 12.4–15.4)
PHOSPHORUS: 1.9 MG/DL (ref 2.5–4.9)
PLATELET # BLD: 379 K/UL (ref 135–450)
PMV BLD AUTO: 8.5 FL (ref 5–10.5)
POTASSIUM SERPL-SCNC: 4.5 MMOL/L (ref 3.5–5.1)
RBC # BLD: 4.5 M/UL (ref 4–5.2)
SODIUM BLD-SCNC: 142 MMOL/L (ref 136–145)
WBC # BLD: 14.1 K/UL (ref 4–11)

## 2019-05-14 PROCEDURE — 80048 BASIC METABOLIC PNL TOTAL CA: CPT

## 2019-05-14 PROCEDURE — 6370000000 HC RX 637 (ALT 250 FOR IP): Performed by: OBSTETRICS & GYNECOLOGY

## 2019-05-14 PROCEDURE — 2580000003 HC RX 258: Performed by: OBSTETRICS & GYNECOLOGY

## 2019-05-14 PROCEDURE — 6360000002 HC RX W HCPCS: Performed by: OBSTETRICS & GYNECOLOGY

## 2019-05-14 PROCEDURE — 83735 ASSAY OF MAGNESIUM: CPT

## 2019-05-14 PROCEDURE — 84100 ASSAY OF PHOSPHORUS: CPT

## 2019-05-14 PROCEDURE — 85025 COMPLETE CBC W/AUTO DIFF WBC: CPT

## 2019-05-14 PROCEDURE — 36415 COLL VENOUS BLD VENIPUNCTURE: CPT

## 2019-05-14 RX ORDER — SENNA PLUS 8.6 MG/1
2 TABLET ORAL NIGHTLY PRN
Qty: 10 TABLET | Refills: 0 | Status: SHIPPED | OUTPATIENT
Start: 2019-05-14 | End: 2020-05-13

## 2019-05-14 RX ORDER — ACETAMINOPHEN 500 MG
500 TABLET ORAL EVERY 6 HOURS PRN
Qty: 30 TABLET | Refills: 0 | Status: SHIPPED | OUTPATIENT
Start: 2019-05-14

## 2019-05-14 RX ORDER — IBUPROFEN 600 MG/1
600 TABLET ORAL EVERY 6 HOURS
Qty: 30 TABLET | Refills: 0 | Status: SHIPPED | OUTPATIENT
Start: 2019-05-14

## 2019-05-14 RX ADMIN — DEXTROSE AND SODIUM CHLORIDE: 5; 450 INJECTION, SOLUTION INTRAVENOUS at 06:34

## 2019-05-14 RX ADMIN — ACETAMINOPHEN 500 MG: 500 TABLET ORAL at 05:47

## 2019-05-14 RX ADMIN — KETOROLAC TROMETHAMINE 15 MG: 30 INJECTION, SOLUTION INTRAMUSCULAR at 07:36

## 2019-05-14 RX ADMIN — KETOROLAC TROMETHAMINE 15 MG: 30 INJECTION, SOLUTION INTRAMUSCULAR at 03:02

## 2019-05-14 RX ADMIN — ENOXAPARIN SODIUM 40 MG: 40 INJECTION SUBCUTANEOUS at 07:36

## 2019-05-14 RX ADMIN — Medication 10 ML: at 07:38

## 2019-05-14 ASSESSMENT — PAIN SCALES - GENERAL
PAINLEVEL_OUTOF10: 1
PAINLEVEL_OUTOF10: 4
PAINLEVEL_OUTOF10: 1
PAINLEVEL_OUTOF10: 1

## 2019-05-14 NOTE — PROGRESS NOTES
Pt states 1 on scale for surgical pain up to bedside commode independently 02 sat 94% RA 5 stab wounds to ABD CDI pt remains afebrile. Fluids d/c pt resting call light in reach.  Incentive spirometer encouraged

## 2019-05-14 NOTE — PROGRESS NOTES
Pt discharged all questions answered followup appt reviewed.  Pt ready to leave unit to private residence

## 2021-03-05 ENCOUNTER — IMMUNIZATION (OUTPATIENT)
Dept: PRIMARY CARE CLINIC | Age: 66
End: 2021-03-05
Payer: COMMERCIAL

## 2021-03-05 PROCEDURE — 0001A COVID-19, PFIZER VACCINE 30MCG/0.3ML DOSE: CPT

## 2021-03-05 PROCEDURE — 91300 COVID-19, PFIZER VACCINE 30MCG/0.3ML DOSE: CPT

## 2021-03-26 ENCOUNTER — IMMUNIZATION (OUTPATIENT)
Dept: PRIMARY CARE CLINIC | Age: 66
End: 2021-03-26
Payer: COMMERCIAL

## 2021-03-26 PROCEDURE — 91300 COVID-19, PFIZER VACCINE 30MCG/0.3ML DOSE: CPT | Performed by: FAMILY MEDICINE

## 2021-03-26 PROCEDURE — 0002A COVID-19, PFIZER VACCINE 30MCG/0.3ML DOSE: CPT | Performed by: FAMILY MEDICINE

## (undated) DEVICE — PAD,ABDOMINAL,8"X10",ST,LF: Brand: MEDLINE

## (undated) DEVICE — BLADELESS OBTURATOR: Brand: WECK VISTA

## (undated) DEVICE — PAD TABLE RAMPED 1 IN TO 2 IN TRENDELENBURG

## (undated) DEVICE — AIRSEAL 8 MM ACCESS PORT AND LOW PROFILE OBTURATOR WITH BLADELESS OPTICAL TIP, 120 MM LENGTH: Brand: AIRSEAL

## (undated) DEVICE — SUTURE V-LOC 90 2-0 VL 9 P-14 VLOCM3245

## (undated) DEVICE — CANNULA SEAL

## (undated) DEVICE — INVIEW CLEAR LEGGINGS: Brand: CONVERTORS

## (undated) DEVICE — SUTURE PROL SZ 0 L30IN NONABSORBABLE BLU MO-6 L26MM 1/2 CIR 8418H

## (undated) DEVICE — NEEDLE SPNL 20GA L3.5IN YEL HUB S STL REG WALL FIT STYL W/

## (undated) DEVICE — MANIPULATOR UTER 3.5CM ULTEM PLAS CUP DELINEATOR FOR USE W/

## (undated) DEVICE — SKIN AFFIX SURG ADHESIVE 72/CS 0.55ML: Brand: MEDLINE

## (undated) DEVICE — ARM DRAPE

## (undated) DEVICE — TIP APPL L35CM RIG FOR SEAL EVICEL

## (undated) DEVICE — KIT OR ROOM TURNOVER W/STRAP

## (undated) DEVICE — CATHETER TRAY 16 FR 5 CC FOL ANTIREFLX SAMPLING PRT DOVER

## (undated) DEVICE — TRI-LUMEN FILTERED TUBE SET WITH ACTIVATED CHARCOAL FILTER: Brand: AIRSEAL

## (undated) DEVICE — DEVICE SECUREMENT AD W/ TRICOT ANCHR PD FOR F LTX SIL CATH

## (undated) DEVICE — TIP COVER ACCESSORY

## (undated) DEVICE — ROBOTIC PK

## (undated) DEVICE — 35 ML SYRINGE LUER-LOCK TIP: Brand: MONOJECT

## (undated) DEVICE — SOLUTION ANTIFOG VIS SYS CLEARIFY LAPSCP

## (undated) DEVICE — TRAY PREP DRY W/ PREM GLV 2 APPL 6 SPNG 2 UNDPD 1 OVERWRAP

## (undated) DEVICE — CHLORAPREP 26ML ORANGE

## (undated) DEVICE — INSUFFLATION NEEDLE TO ESTABLISH PNEUMOPERITONEUM.: Brand: INSUFFLATION NEEDLE

## (undated) DEVICE — PAD TBL OP RM TRENDELENBURG STATIC TORSO W/STRAPS

## (undated) DEVICE — ANCHOR TISSUE RETRIEVAL SYSTEM, BAG SIZE 125 ML, PORT SIZE 8 MM: Brand: ANCHOR TISSUE RETRIEVAL SYSTEM

## (undated) DEVICE — STANDARD HYPODERMIC NEEDLE,POLYPROPYLENE HUB: Brand: MONOJECT

## (undated) DEVICE — LIGHT HANDLE: Brand: DEVON

## (undated) DEVICE — SOLUTION IV IRRIG POUR BRL 0.9% SODIUM CHL 2F7124

## (undated) DEVICE — SUTURE DEV SZ 2-0 WND CLSR ABSRB GS-22 VLOC COVIDIEN VLOCM2145

## (undated) DEVICE — SYRINGE, LUER LOCK, 10ML: Brand: MEDLINE

## (undated) DEVICE — PROTECTOR EYE PT SELF ADH NS OPT GRD LF